# Patient Record
Sex: FEMALE | Race: WHITE | NOT HISPANIC OR LATINO
[De-identification: names, ages, dates, MRNs, and addresses within clinical notes are randomized per-mention and may not be internally consistent; named-entity substitution may affect disease eponyms.]

---

## 2019-12-09 ENCOUNTER — APPOINTMENT (OUTPATIENT)
Dept: ORTHOPEDIC SURGERY | Facility: CLINIC | Age: 59
End: 2019-12-09
Payer: COMMERCIAL

## 2019-12-09 DIAGNOSIS — S76.312D STRAIN OF MUSCLE, FASCIA AND TENDON OF THE POSTERIOR MUSCLE GROUP AT THIGH LEVEL, LEFT THIGH, SUBSEQUENT ENCOUNTER: ICD-10-CM

## 2019-12-09 PROBLEM — Z00.00 ENCOUNTER FOR PREVENTIVE HEALTH EXAMINATION: Status: ACTIVE | Noted: 2019-12-09

## 2019-12-09 PROCEDURE — 99204 OFFICE O/P NEW MOD 45 MIN: CPT

## 2019-12-09 NOTE — HISTORY OF PRESENT ILLNESS
[de-identified] : Patient reports that she injured her proximal hamstring on 11/16/19 while in a boot camp class. She reports torn hamstring as per MRI.  Patient states initially had discomfort which over the last ensuing weeks as a result of mild soreness. She denies any ecchymosis. She denies any weakness.

## 2019-12-09 NOTE — ASSESSMENT
[FreeTextEntry1] : Discussed at length with patient her exam and no significant weakness as well as the imaging with a proximal hamstring rupture. Detailed discussion regarding nonoperative versus operative treatments the patient expresses understanding and elects to proceed with home exercises and physical therapy only at this time. Patient aware that nonoperative versus operative treatments can take months to improve.  Patient follow up in one month's

## 2019-12-09 NOTE — PHYSICAL EXAM
[de-identified] : MRI pelvis. There is a proximal hamstring rupture with mild retraction. [de-identified] : On exam of the right proximal thigh hip, there is mild tenderness at the initial tuberosity. There is no palpable step-off however and there is 5 out of 5 knee flexion. There is no swelling there is no ecchymosis. There is 5 out of 5 hip abduction and adduction.  There is normal sensation light touch L2-S1.  There is no pain with hip range of motion.  Please note examination was performed with female chaperone Chanelle